# Patient Record
Sex: FEMALE | Race: BLACK OR AFRICAN AMERICAN | NOT HISPANIC OR LATINO | URBAN - NONMETROPOLITAN AREA
[De-identification: names, ages, dates, MRNs, and addresses within clinical notes are randomized per-mention and may not be internally consistent; named-entity substitution may affect disease eponyms.]

---

## 2019-09-13 ENCOUNTER — IMPORTED ENCOUNTER (OUTPATIENT)
Dept: URBAN - NONMETROPOLITAN AREA CLINIC 1 | Facility: CLINIC | Age: 69
End: 2019-09-13

## 2019-09-13 PROCEDURE — 92015 DETERMINE REFRACTIVE STATE: CPT

## 2019-09-13 PROCEDURE — 92014 COMPRE OPH EXAM EST PT 1/>: CPT

## 2019-09-13 NOTE — PATIENT DISCUSSION
pseudophakia ODmonitor*CATARACT OBSERVE-NO CHANGE IN GLASSES:. OS-  We discussed the patients cataract(s) in detail with the patient. -  The patient/ physician elected to wait on surgery at this time. -  The patient was informed of the symptoms related to cataract progression. *Diabetic W/O  Retinopathy:. -  Discussed findings of exam in detail with the patient. -  discussed the risk of diabetic damage of the retina with potential vision loss and the importance of routine follow-up.

## 2020-08-06 ENCOUNTER — IMPORTED ENCOUNTER (OUTPATIENT)
Dept: URBAN - NONMETROPOLITAN AREA CLINIC 1 | Facility: CLINIC | Age: 70
End: 2020-08-06

## 2020-08-06 PROBLEM — E11.9: Noted: 2020-08-06

## 2020-08-06 PROBLEM — H25.12: Noted: 2020-08-06

## 2020-08-06 PROCEDURE — 92014 COMPRE OPH EXAM EST PT 1/>: CPT

## 2020-08-06 NOTE — PATIENT DISCUSSION
pseudophakia ODmonitor for pco*Diabetic W/O  Retinopathy:. -  Discussed findings of exam in detail with the patient. -  discussed the risk of diabetic damage of the retina with potential vision loss and the importance of routine follow-up. Cataract OS-Not yet surgical. -Reviewed symptoms of advancing cataract growth such as glare and halos and decreased vision.-Continue to monitor for now. Pt will notify us if any new symptoms develop.

## 2021-08-20 ENCOUNTER — IMPORTED ENCOUNTER (OUTPATIENT)
Dept: URBAN - NONMETROPOLITAN AREA CLINIC 1 | Facility: CLINIC | Age: 71
End: 2021-08-20

## 2021-08-20 PROCEDURE — 92015 DETERMINE REFRACTIVE STATE: CPT

## 2021-08-20 PROCEDURE — 92014 COMPRE OPH EXAM EST PT 1/>: CPT

## 2021-08-20 NOTE — PATIENT DISCUSSION
pseudophakia ODmonitor for pco*Diabetic W/O  Retinopathy:. -  Discussed findings of exam in detail with the patient. -  discussed the risk of diabetic damage of the retina with potential vision loss and the importance of routine follow-up. Cataract OS +PROGRESSION-Not yet surgical. -Reviewed symptoms of advancing cataract growth such as glare and halos and decreased vision.-Continue to monitor for now. Pt will notify us if any new symptoms develop.

## 2022-04-15 ASSESSMENT — VISUAL ACUITY
OD_SC: 20/20
OD_CC: J1
OD_CC: J2
OS_SC: 20/40+2
OD_SC: 20/20
OD_SC: 20/40
OS_CC: J1
OS_SC: 20/200
OS_SC: 20/100
OS_CC: J2

## 2022-04-15 ASSESSMENT — TONOMETRY
OS_IOP_MMHG: 18
OD_IOP_MMHG: 18
OS_IOP_MMHG: 17
OS_IOP_MMHG: 19
OD_IOP_MMHG: 18
OD_IOP_MMHG: 18

## 2022-08-26 ENCOUNTER — COMPREHENSIVE EXAM (OUTPATIENT)
Dept: RURAL CLINIC 1 | Facility: CLINIC | Age: 72
End: 2022-08-26

## 2022-08-26 DIAGNOSIS — Z96.1: ICD-10-CM

## 2022-08-26 DIAGNOSIS — H25.12: ICD-10-CM

## 2022-08-26 DIAGNOSIS — E11.9: ICD-10-CM

## 2022-08-26 PROCEDURE — 92014 COMPRE OPH EXAM EST PT 1/>: CPT

## 2022-08-26 ASSESSMENT — TONOMETRY
OD_IOP_MMHG: 18
OS_IOP_MMHG: 18

## 2022-08-26 ASSESSMENT — VISUAL ACUITY
OS_BAT: 20/40
OD_CC: 20/20
OS_CC: 20/80

## 2022-10-26 ENCOUNTER — CONSULTATION/EVALUATION (OUTPATIENT)
Dept: RURAL CLINIC 1 | Facility: CLINIC | Age: 72
End: 2022-10-26

## 2022-10-26 DIAGNOSIS — Z01.818: ICD-10-CM

## 2022-10-26 DIAGNOSIS — H25.12: ICD-10-CM

## 2022-10-26 PROCEDURE — 99214 OFFICE O/P EST MOD 30 MIN: CPT

## 2022-10-26 ASSESSMENT — VISUAL ACUITY
OS_AM: 20/30
OS_BAT: 20/200
OS_CC: 20/80
OS_SC: 20/100-1
OD_CC: 20/20
OS_CC: 20/40

## 2022-10-26 ASSESSMENT — TONOMETRY
OS_IOP_MMHG: 16
OD_IOP_MMHG: 16

## 2022-10-26 NOTE — PATIENT DISCUSSION
Visually significant cataract. Cataract(s) causing symptomatic impairment of visual function not correctable with a tolerable change in glasses or contact lenses, lighting, or non-operative means resulting in specific activity limitations and/or participation restrictions including, but not limited to reading, viewing television, driving, or meeting vocational or recreational needs. Expectation is clearer vision and functional improvement in symptoms as well as reduced glare disability after cataract removal. Order IOL Master and OPD today. Recommend Toric IOL / Limbal Relaxing Incisions / Multifocal, based on today's OPD testing and lifestyle questionnaire. All questions were answered regarding surgery, including pre- and post-op medications, appointments, activity restrictions and anesthetic usage. The risks, benefits and alternatives, and special risk factors for the patient, were discussed in detail, including but not limited to bleeding, infection, retinal detachment, vitreous loss, problems with the implant, and possible need for additional surgery. Although rare, the possibility of complete vision loss was discussed. The possible need for glasses post-operatively was discussed. Order medical clearance exam based on history of diabetes. Patient elects to proceed with cataract surgery OS . Will schedule at patient's convenience. Pt elects to proceed with Toric as she did for OS and will proceed with Toric/trad.

## 2022-11-01 ENCOUNTER — PRE-OP/H&P (OUTPATIENT)
Dept: RURAL CLINIC 1 | Facility: CLINIC | Age: 72
End: 2022-11-01

## 2022-11-01 VITALS
HEIGHT: 61 IN | DIASTOLIC BLOOD PRESSURE: 82 MMHG | BODY MASS INDEX: 43.05 KG/M2 | WEIGHT: 228 LBS | SYSTOLIC BLOOD PRESSURE: 118 MMHG

## 2022-11-01 DIAGNOSIS — H25.12: ICD-10-CM

## 2022-11-01 DIAGNOSIS — Z01.818: ICD-10-CM

## 2022-11-01 PROCEDURE — 92134 CPTRZ OPH DX IMG PST SGM RTA: CPT

## 2022-11-01 PROCEDURE — 99499 UNLISTED E&M SERVICE: CPT

## 2022-11-07 ENCOUNTER — SURGERY/PROCEDURE (OUTPATIENT)
Dept: URBAN - METROPOLITAN AREA SURGERY 3 | Facility: SURGERY | Age: 72
End: 2022-11-07

## 2022-11-07 DIAGNOSIS — H25.12: ICD-10-CM

## 2022-11-07 PROCEDURE — V2787 ASTIGMATISM-CORRECT FUNCTION: HCPCS

## 2022-11-07 PROCEDURE — 68841 INSJ RX ELUT IMPLT LAC CANAL: CPT

## 2022-11-07 PROCEDURE — 66984 XCAPSL CTRC RMVL W/O ECP: CPT

## 2022-11-08 ENCOUNTER — POST-OP (OUTPATIENT)
Dept: RURAL CLINIC 1 | Facility: CLINIC | Age: 72
End: 2022-11-08

## 2022-11-08 DIAGNOSIS — Z96.1: ICD-10-CM

## 2022-11-08 PROCEDURE — 99024 POSTOP FOLLOW-UP VISIT: CPT

## 2022-11-08 ASSESSMENT — TONOMETRY
OS_IOP_MMHG: 28
OD_IOP_MMHG: 22

## 2022-11-08 ASSESSMENT — VISUAL ACUITY
OD_SC: 20/20
OS_SC: 20/40+2

## 2022-11-14 ENCOUNTER — POST-OP (OUTPATIENT)
Dept: RURAL CLINIC 1 | Facility: CLINIC | Age: 72
End: 2022-11-14

## 2022-11-14 DIAGNOSIS — Z96.1: ICD-10-CM

## 2022-11-14 PROCEDURE — 99024 POSTOP FOLLOW-UP VISIT: CPT

## 2022-11-14 ASSESSMENT — VISUAL ACUITY
OU_CC: 20/20
OU_CC: 20/30+3
OS_CC: 20/30-1
OD_CC: 20/25

## 2022-11-14 ASSESSMENT — TONOMETRY
OS_IOP_MMHG: 22
OD_IOP_MMHG: 20

## 2024-02-15 ENCOUNTER — ESTABLISHED PATIENT (OUTPATIENT)
Dept: RURAL CLINIC 1 | Facility: CLINIC | Age: 74
End: 2024-02-15

## 2024-02-15 DIAGNOSIS — E11.9: ICD-10-CM

## 2024-02-15 DIAGNOSIS — Z96.1: ICD-10-CM

## 2024-02-15 PROCEDURE — 92014 COMPRE OPH EXAM EST PT 1/>: CPT

## 2024-02-15 ASSESSMENT — TONOMETRY
OD_IOP_MMHG: 17
OS_IOP_MMHG: 18

## 2024-02-15 ASSESSMENT — VISUAL ACUITY
OS_CC: 20/30
OD_CC: 20/20

## 2025-03-27 ENCOUNTER — COMPREHENSIVE EXAM (OUTPATIENT)
Age: 75
End: 2025-03-27

## 2025-03-27 DIAGNOSIS — E11.9: ICD-10-CM

## 2025-03-27 DIAGNOSIS — Z96.1: ICD-10-CM

## 2025-03-27 PROCEDURE — 92014 COMPRE OPH EXAM EST PT 1/>: CPT
